# Patient Record
Sex: MALE | Race: WHITE | Employment: FULL TIME | URBAN - METROPOLITAN AREA
[De-identification: names, ages, dates, MRNs, and addresses within clinical notes are randomized per-mention and may not be internally consistent; named-entity substitution may affect disease eponyms.]

---

## 2024-07-12 ENCOUNTER — HOSPITAL ENCOUNTER (OUTPATIENT)
Age: 39
Discharge: HOME | End: 2024-07-12
Payer: COMMERCIAL

## 2024-07-12 ENCOUNTER — APPOINTMENT (OUTPATIENT)
Dept: GENERAL RADIOLOGY | Age: 39
End: 2024-07-12
Attending: PHYSICIAN ASSISTANT
Payer: COMMERCIAL

## 2024-07-12 VITALS
RESPIRATION RATE: 18 BRPM | HEART RATE: 88 BPM | TEMPERATURE: 98 F | OXYGEN SATURATION: 100 % | DIASTOLIC BLOOD PRESSURE: 89 MMHG | SYSTOLIC BLOOD PRESSURE: 133 MMHG

## 2024-07-12 DIAGNOSIS — J06.9 VIRAL URI WITH COUGH: Primary | ICD-10-CM

## 2024-07-12 PROCEDURE — 71046 X-RAY EXAM CHEST 2 VIEWS: CPT | Performed by: PHYSICIAN ASSISTANT

## 2024-07-12 PROCEDURE — 99203 OFFICE O/P NEW LOW 30 MIN: CPT | Performed by: PHYSICIAN ASSISTANT

## 2024-07-12 RX ORDER — BENZONATATE 100 MG/1
100 CAPSULE ORAL 3 TIMES DAILY PRN
Qty: 21 CAPSULE | Refills: 0 | Status: SHIPPED | OUTPATIENT
Start: 2024-07-12 | End: 2024-07-19

## 2024-07-12 NOTE — ED INITIAL ASSESSMENT (HPI)
Patient comes in states that he has had a productive cough continuing to get worse  for a month now and has had been using cold and sinus advil, and yesterday his mother who also had the same productive cough collapsed and went to ER and was dx with pneumonia and would also like a chest xray to rule out the same

## 2024-07-12 NOTE — ED PROVIDER NOTES
Patient Seen in: [facility]      History     Chief Complaint   Patient presents with    Cough     Stated Complaint: Cough    Subjective:   HPI    39 yo male here with c/o lingering cough for the last several weeks. States he had URI symptoms for a few weeks but cough now more wet sounding. Denies fever/chills, wheezing, cp or SOB. No change in exercise tolerance. Pt concerned as his mother as recently dx with pna.  Has been taking advil cold and sinus.    Objective:   History reviewed. No pertinent past medical history.           History reviewed. No pertinent surgical history.             Social History     Socioeconomic History    Marital status:    Tobacco Use    Smoking status: Never     Passive exposure: Never    Smokeless tobacco: Never   Vaping Use    Vaping status: Never Used   Substance and Sexual Activity    Alcohol use: Never    Drug use: Yes     Types: Cannabis     Comment: vape     Social Determinants of Health      Received from [facility]    Social Connections    Received from [facility]    Housing Stability              Review of Systems    Positive for stated Chief Complaint: Cough    Other systems are as noted in HPI.  Constitutional and vital signs reviewed.      All other systems reviewed and negative except as noted above.    Physical Exam     ED Triage Vitals [07/12/24 1523]   /89   Pulse 88   Resp 18   Temp 97.5 °F (36.4 °C)   Temp src Temporal   SpO2 100 %   O2 Device None (Room air)       Current Vitals:   Vital Signs  BP: 133/89  Pulse: 88  Resp: 18  Temp: 97.5 °F (36.4 °C)  Temp src: Temporal    Oxygen Therapy  SpO2: 100 %  O2 Device: None (Room air)            Physical Exam  Vitals and nursing note reviewed.   Constitutional:       General: He is not in acute distress.  HENT:      Head: Normocephalic and atraumatic.      Right Ear: External ear normal.      Left Ear: External ear normal.      Nose: Nose normal.       Mouth/Throat:      Mouth: Mucous membranes are moist.   Eyes:      Extraocular Movements: Extraocular movements intact.      Pupils: Pupils are equal, round, and reactive to light.   Cardiovascular:      Rate and Rhythm: Normal rate.   Pulmonary:      Effort: Pulmonary effort is normal.      Breath sounds: No wheezing or rhonchi.   Abdominal:      General: Abdomen is flat.   Musculoskeletal:         General: Normal range of motion.      Cervical back: Normal range of motion.   Skin:     General: Skin is warm.   Neurological:      General: No focal deficit present.      Mental Status: He is alert and oriented to person, place, and time.   Psychiatric:         Mood and Affect: Mood normal.         Behavior: Behavior normal.               ED Course   Labs Reviewed - No data to display      39 yo male here with c/o cough and congestion. Pt afebrile.  Lungs clear with no wheezing or rhonchi.  No hypoxia, no tachycardia    Ddx-viral URI, allergic rhinitis, pneumonia  Cxr negative for infiltrate or abnormality    Trial of tessalon. Pcp follow up rec'd              MDM                                         Medical Decision Making      Disposition and Plan     Clinical Impression:  1. Viral URI with cough         Disposition:  Discharge  7/12/2024  4:02 pm    Follow-up:  Deisy Pinto MD  [address] [facility]  2ND FLOOR  [facility] [address]  [phone]                Medications Prescribed:  Discharge Medication List as of 7/12/2024  4:04 PM        START taking these medications    Details   benzonatate 100 MG Oral Cap Take 1 capsule (100 mg total) by mouth 3 (three) times daily as needed for cough., Normal, Disp-21 capsule, R-0

## 2025-03-01 ENCOUNTER — HOSPITAL ENCOUNTER (OUTPATIENT)
Age: 40
Discharge: HOME | End: 2025-03-01
Payer: COMMERCIAL

## 2025-03-01 VITALS
DIASTOLIC BLOOD PRESSURE: 70 MMHG | SYSTOLIC BLOOD PRESSURE: 105 MMHG | RESPIRATION RATE: 16 BRPM | TEMPERATURE: 98 F | OXYGEN SATURATION: 99 % | HEART RATE: 63 BPM

## 2025-03-01 DIAGNOSIS — R30.0 DYSURIA: Primary | ICD-10-CM

## 2025-03-01 LAB
BILIRUB UR QL STRIP: NEGATIVE
CLARITY UR: CLEAR
COLOR UR: YELLOW
GLUCOSE UR STRIP-MCNC: NEGATIVE MG/DL
HGB UR QL STRIP: NEGATIVE
KETONES UR STRIP-MCNC: NEGATIVE MG/DL
LEUKOCYTE ESTERASE UR QL STRIP: NEGATIVE
NITRITE UR QL STRIP: NEGATIVE
PH UR STRIP: 7 [PH]
PROT UR STRIP-MCNC: NEGATIVE MG/DL
SP GR UR STRIP: 1.02
UROBILINOGEN UR STRIP-ACNC: <2 MG/DL

## 2025-03-01 PROCEDURE — 81002 URINALYSIS NONAUTO W/O SCOPE: CPT | Performed by: NURSE PRACTITIONER

## 2025-03-01 PROCEDURE — 99213 OFFICE O/P EST LOW 20 MIN: CPT | Performed by: NURSE PRACTITIONER

## 2025-03-01 PROCEDURE — 87491 CHLMYD TRACH DNA AMP PROBE: CPT | Performed by: NURSE PRACTITIONER

## 2025-03-01 PROCEDURE — 87591 N.GONORRHOEAE DNA AMP PROB: CPT | Performed by: NURSE PRACTITIONER

## 2025-03-01 RX ORDER — SULFAMETHOXAZOLE AND TRIMETHOPRIM 800; 160 MG/1; MG/1
1 TABLET ORAL 2 TIMES DAILY
Qty: 20 TABLET | Refills: 0 | Status: SHIPPED | OUTPATIENT
Start: 2025-03-01 | End: 2025-03-11

## 2025-03-01 NOTE — ED INITIAL ASSESSMENT (HPI)
Burning with urination, that starts off in the tip of the penis and then into the body. X 1week ago.

## 2025-03-01 NOTE — ED PROVIDER NOTES
Patient Seen in: [facility]      History     Chief Complaint   Patient presents with    Urinary Symptoms     Suspect UTI. Burning sensation in penis. - Entered by patient     Stated Complaint: Urinary Symptoms - Suspect UTI. Burning sensation in penis.    Subjective:   HPI    39-year-old male presents with concern for burning when he urinates.  He has had the symptoms for 1 week.  He denies penile discharge.  He denies penile pain swelling testicle pain or swelling.  There is no abdominal pain.  There was no fever.  Patient denies concern for STDs.  Patient is circumcised.      Objective:     History reviewed. No pertinent past medical history.           Past Surgical History:   Procedure Laterality Date    Vasectomy  01/06/2025                Social History     Socioeconomic History    Marital status:    Tobacco Use    Smoking status: Never     Passive exposure: Never    Smokeless tobacco: Never   Vaping Use    Vaping status: Never Used   Substance and Sexual Activity    Alcohol use: Never    Drug use: Yes     Types: Cannabis     Comment: vape     Social Drivers of Health      Received from [facility]    Housing Stability              Review of Systems    Positive for stated complaint: Urinary Symptoms - Suspect UTI. Burning sensation in penis.  Other systems are as noted in HPI.  Constitutional and vital signs reviewed.      All other systems reviewed and negative except as noted above.    Physical Exam     ED Triage Vitals [03/01/25 0849]   /70   Pulse 63   Resp 16   Temp 98 °F (36.7 °C)   Temp src Oral   SpO2 99 %   O2 Device None (Room air)       Current Vitals:   Vital Signs  BP: 105/70  Pulse: 63  Resp: 16  Temp: 98 °F (36.7 °C)  Temp src: Oral    Oxygen Therapy  SpO2: 99 %  O2 Device: None (Room air)        Physical Exam  Vitals reviewed.   Constitutional:       General: He is not in acute distress.     Appearance: He is not ill-appearing.   HENT:       Mouth/Throat:      Mouth: Mucous membranes are moist.   Cardiovascular:      Rate and Rhythm: Normal rate and regular rhythm.   Pulmonary:      Effort: Pulmonary effort is normal.      Breath sounds: Normal breath sounds.   Abdominal:      Palpations: Abdomen is soft.      Tenderness: There is no abdominal tenderness.   Musculoskeletal:         General: Normal range of motion.   Skin:     General: Skin is warm and dry.   Neurological:      General: No focal deficit present.      Mental Status: He is alert and oriented to person, place, and time.   Psychiatric:         Mood and Affect: Mood normal.         Behavior: Behavior normal.             ED Course     Labs Reviewed   [facility] POCT URINALYSIS DIPSTICK   CHLAMYDIA/GONOCOCCUS, ANAM                   MDM              Medical Decision Making  39-year-old male presents with dysuria.  Differential diagnosis includes UTI, kidney stone, STD, [place].  Patient reports 1 week of dysuria.  He denies hematuria.  There is no penile discharge.  He denies concern for STDs.  Urine specimen was collected and was negative for UTI.  No hematuria.  Urine specimen was sent for gonorrhea and chlamydia.  Today's urine results were discussed with patient.  The cause of his dysuria is unclear but could be a prostatitis.  Will treat patient with Bactrim.  He was instructed to follow-up with his primary or urology if his symptoms worsen or do not improve.    Amount and/or Complexity of Data Reviewed  Labs: ordered.     Details: POC urine shows neg UTI, neg blood    Risk  OTC drugs.  Prescription drug management.        Disposition and Plan     Clinical Impression:  1. Dysuria         Disposition:  Discharge  3/1/2025  9:29 am    Follow-up:  Deisy Pinto MD  [address] [facility]  2ND FLOOR  [facility] [address]  [phone]      If symptoms worsen          Medications Prescribed:  Discharge Medication List as of 3/1/2025  9:29 AM        START taking these medications    Details    sulfamethoxazole-trimethoprim -160 MG Oral Tab per tablet Take 1 tablet by mouth 2 (two) times daily for 10 days., Normal, Disp-20 tablet, R-0                 Supplementary Documentation:

## 2025-03-03 LAB
C TRACH DNA SPEC QL NAA+PROBE: NEGATIVE
N GONORRHOEA DNA SPEC QL NAA+PROBE: NEGATIVE

## 2025-03-10 ENCOUNTER — HOSPITAL ENCOUNTER (OUTPATIENT)
Age: 40
Discharge: HOME | End: 2025-03-10
Payer: COMMERCIAL

## 2025-03-10 VITALS
HEART RATE: 99 BPM | OXYGEN SATURATION: 99 % | SYSTOLIC BLOOD PRESSURE: 126 MMHG | RESPIRATION RATE: 18 BRPM | TEMPERATURE: 99 F | DIASTOLIC BLOOD PRESSURE: 74 MMHG

## 2025-03-10 DIAGNOSIS — L27.0 DRUG-INDUCED SKIN RASH: Primary | ICD-10-CM

## 2025-03-10 PROCEDURE — 99213 OFFICE O/P EST LOW 20 MIN: CPT | Performed by: PHYSICIAN ASSISTANT

## 2025-03-10 RX ORDER — PREDNISONE 20 MG/1
40 TABLET ORAL ONCE
Status: COMPLETED | OUTPATIENT
Start: 2025-03-10 | End: 2025-03-10

## 2025-03-10 RX ORDER — PREDNISONE 20 MG/1
40 TABLET ORAL DAILY
Qty: 8 TABLET | Refills: 0 | Status: SHIPPED | OUTPATIENT
Start: 2025-03-10 | End: 2025-03-14

## 2025-03-10 NOTE — ED PROVIDER NOTES
Patient Seen in: [facility]      History     Chief Complaint   Patient presents with    Allergies     Broke out in significant hives overnight, possibly due to antibiotic prescribed March 1 - Entered by patient    Allergic Rxn Allergies     Stated Complaint: Allergies - Broke out in significant hives overnight, possibly due to antibioti*    Subjective:   HPI      Patient is a healthy 39-year-old male who presents to immediate care due to pruritic rash x 2 days.  Patient states he has had gradual onset pruritic rash starting at bilateral lower legs now diffuse on torso and bilateral extremities.  Patient has taken loratadine with mild relief.  Patient states he was recently taking Bactrim for UTI that has now since resolved.  Denies wheezing shortness of breath, oral sores, vomiting.      Objective:     History reviewed. No pertinent past medical history.           Past Surgical History:   Procedure Laterality Date    Vasectomy  01/06/2025                Social History     Socioeconomic History    Marital status:    Tobacco Use    Smoking status: Never     Passive exposure: Never    Smokeless tobacco: Never   Vaping Use    Vaping status: Never Used   Substance and Sexual Activity    Alcohol use: Never    Drug use: Yes     Types: Cannabis     Comment: vape     Social Drivers of Health      Received from [facility]    Housing Stability              Review of Systems    Positive for stated complaint: Allergies - Broke out in significant hives overnight, possibly due to antibioti*  Other systems are as noted in HPI.  Constitutional and vital signs reviewed.      All other systems reviewed and negative except as noted above.    Physical Exam     ED Triage Vitals [03/10/25 1320]   /74   Pulse 99   Resp 18   Temp 99.1 °F (37.3 °C)   Temp src Oral   SpO2 99 %   O2 Device None (Room air)       Current Vitals:   Vital Signs  BP: 126/74  Pulse: 99  Resp: 18  Temp: 99.1 °F (37.3  °C)  Temp src: Oral    Oxygen Therapy  SpO2: 99 %  O2 Device: None (Room air)        Physical Exam  Vital signs reviewed. Nursing note reviewed.  Constitutional: Well-developed. Well-nourished. In no acute distress  HENT: Mucous membranes moist.  No posterior pharynx edema erythema.  No oral sores or lesions.  No trismus  EYES: No scleral icterus or conjunctival injection.  NECK: Full ROM. Supple.   CARDIAC: Normal rate. Normal S1/ S2. 2+ distal pulses. No edema  PULM/CHEST: Clear to auscultation bilaterally. No wheezes  Extremities: Full ROM  NEURO: Awake, alert, following commands, moving extremities, answering questions.   SKIN: Warm and dry.  Morbilliform diffuse rash on torso and extremities.  PSYCH: Normal judgment. Normal affect.           MDM      Patient is a 39-year-old male that presents to immediate care due to pruritic rash x 2 days.  Patient arrives with stable vitals sitting comfortably speaking complete sentences in no respiratory distress.  Physical exam showing diffuse morbilliform rash on torso and extremities sparing face.  Most likely drug eruption rash, allergic reaction.  Less likely angioedema, anaphylaxis, SJS, TEN.  Will treat with prednisone prior to discharge.  Encouraged use of antihistamines such as Claritin Zyrtec and Benadryl.  Will additionally prescribe 4-day course of prednisone.  Discussed with patient to Discontinue antibiotics and added Bactrim to patient's drug allergy list.  History given by patient.  ED precautions discussed including worsening rash, development of shortness of breath, oral lesions or skin sloughing.  Patient agreeable to plan all questions answered.        Medical Decision Making      Disposition and Plan     Clinical Impression:  1. Drug-induced skin rash         Disposition:  Discharge  3/10/2025  1:55 pm    Follow-up:  Deisy Pinto MD  [address] [facility]  2ND FLOOR  [facility] [address]  [phone]    Call       [facility]  [address]  [facility] [id number] [phone]  Go to   if symptoms get worse          Medications Prescribed:  Current Discharge Medication List        START taking these medications    Details   predniSONE 20 MG Oral Tab Take 2 tablets (40 mg total) by mouth daily for 4 days.  Qty: 8 tablet, Refills: 0                 Supplementary Documentation:

## 2025-03-10 NOTE — ED INITIAL ASSESSMENT (HPI)
Was on bactrim ABX for uti one week ago. Broke out in hives on day 9/10 last night.   Took loratadine for the hives.   Denies sob, a bit of a voice change

## 2025-03-10 NOTE — DISCHARGE INSTRUCTIONS
Today you were seen for a rash that is most likely an allergic reaction due to Bactrim, the antibiotic that you most recently took.  Please discontinue these antibiotics.  Please take antihistamines to additionally help with symptoms.  You may take loratadine dose every day in the morning.  You may additionally take 50 mg of Benadryl at nighttime over the next few days.  Please take prednisone as prescribed.  You were given 1 dose prior to discharge.  Please start additional dose of prednisone tomorrow morning.  Seek prompt medical reevaluation if you begin to have worsening rash, development of skin sloughing, shortness of breath wheezing or mouth sores.

## 2025-03-21 ENCOUNTER — HOSPITAL ENCOUNTER (OUTPATIENT)
Age: 40
Discharge: HOME | End: 2025-03-21
Payer: COMMERCIAL

## 2025-03-21 VITALS
TEMPERATURE: 99 F | HEART RATE: 71 BPM | SYSTOLIC BLOOD PRESSURE: 120 MMHG | DIASTOLIC BLOOD PRESSURE: 81 MMHG | RESPIRATION RATE: 16 BRPM | OXYGEN SATURATION: 100 %

## 2025-03-21 DIAGNOSIS — J02.9 SORE THROAT: Primary | ICD-10-CM

## 2025-03-21 LAB — S PYO AG THROAT QL: NEGATIVE

## 2025-03-21 PROCEDURE — 87880 STREP A ASSAY W/OPTIC: CPT | Performed by: PHYSICIAN ASSISTANT

## 2025-03-21 PROCEDURE — 99212 OFFICE O/P EST SF 10 MIN: CPT | Performed by: PHYSICIAN ASSISTANT

## 2025-03-21 NOTE — ED PROVIDER NOTES
Patient Seen in: [facility]      History     Chief Complaint   Patient presents with    Sore Throat     Stated Complaint: Throat issue    Subjective:   HPI    39-year-old male presenting for evaluation of sore throat for the last 2 to 3 days.  No associated fever/chills, change in voice.  He has no complaint of neck pain.  Children at home have been sick.    Objective:     No pertinent past medical history.            No pertinent past surgical history.              No pertinent social history.            Review of Systems    Positive for stated complaint: Throat issue  Other systems are as noted in HPI.  Constitutional and vital signs reviewed.      All other systems reviewed and negative except as noted above.    Physical Exam     ED Triage Vitals [03/21/25 1214]   /81   Pulse 71   Resp 16   Temp 98.8 °F (37.1 °C)   Temp src Oral   SpO2 100 %   O2 Device None (Room air)       Current Vitals:   Vital Signs  BP: 120/81  Pulse: 71  Resp: 16  Temp: 98.8 °F (37.1 °C)  Temp src: Oral    Oxygen Therapy  SpO2: 100 %  O2 Device: None (Room air)        Physical Exam  Vitals and nursing note reviewed.   Constitutional:       General: He is not in acute distress.  HENT:      Head: Normocephalic and atraumatic.      Right Ear: External ear normal.      Left Ear: External ear normal.      Nose: Nose normal.      Mouth/Throat:      Mouth: Mucous membranes are moist.      Tonsils: No tonsillar exudate.   Eyes:      Extraocular Movements: Extraocular movements intact.      Pupils: Pupils are equal, round, and reactive to light.   Cardiovascular:      Rate and Rhythm: Normal rate.   Pulmonary:      Effort: Pulmonary effort is normal.   Abdominal:      General: Abdomen is flat.   Musculoskeletal:         General: Normal range of motion.      Cervical back: Normal range of motion.   Skin:     General: Skin is warm.   Neurological:      General: No focal deficit present.      Mental Status: He is alert and  oriented to person, place, and time.   Psychiatric:         Mood and Affect: Mood normal.         Behavior: Behavior normal.             ED Course     Labs Reviewed   POCT RAPID STREP - Normal        39-year-old male presenting from home for evaluation of sore throat for the last 2 to 3 days.  He is afebrile.  Posterior oropharynx with minimal erythema, no edema and no exudates.  Uvula midline.    Ddx-viral pharyngitis, postnasal drip, strep pharyngitis, influenza    Strep test negative.  Suspect allergic rhinitis versus postnasal drip.  Continue supportive care, OTC meds.           MDM              Medical Decision Making      Disposition and Plan     Clinical Impression:  1. Sore throat         Disposition:  Discharge  3/21/2025 12:28 pm    Follow-up:  Deisy iPnto MD  [address] [place] [address]  2ND FLOOR  [facility] [id number] [phone]                Medications Prescribed:  Current Discharge Medication List              Supplementary Documentation:

## 2025-06-23 ENCOUNTER — HOSPITAL ENCOUNTER (OUTPATIENT)
Age: 40
Discharge: HOME | End: 2025-06-23
Payer: COMMERCIAL

## 2025-06-23 VITALS
HEART RATE: 69 BPM | SYSTOLIC BLOOD PRESSURE: 129 MMHG | DIASTOLIC BLOOD PRESSURE: 83 MMHG | OXYGEN SATURATION: 98 % | RESPIRATION RATE: 16 BRPM | TEMPERATURE: 98 F

## 2025-06-23 DIAGNOSIS — L03.032 PARONYCHIA OF GREAT TOE OF LEFT FOOT: Primary | ICD-10-CM

## 2025-06-23 PROCEDURE — 99213 OFFICE O/P EST LOW 20 MIN: CPT | Performed by: NURSE PRACTITIONER

## 2025-06-23 RX ORDER — CEPHALEXIN 500 MG/1
500 CAPSULE ORAL 4 TIMES DAILY
Qty: 28 CAPSULE | Refills: 0 | Status: SHIPPED | OUTPATIENT
Start: 2025-06-23 | End: 2025-06-30

## 2025-06-23 NOTE — DISCHARGE INSTRUCTIONS
Start warm soaks of left foot, 3-4 times a day for 5 minutes to help with inflammation around the nailbed of your great toe    If you noticed this area open, do some gentle pressing, rinse with water, pat dry and place mupirocin ointment 3 times a day until healed.    If this area does not open and drain, more redness, swelling and pain occur around nailbed, fevers develop, start oral antibiotics sent to your pharmacy on file.    If you are doing these things and the swelling, redness, discomfort continues, there is change in skin color to yellow with pus underlying the skin, please come back for incision and drainage of paronychia around nailbed

## 2025-06-23 NOTE — ED PROVIDER NOTES
Patient Seen in: [facility]        History  Chief Complaint   Patient presents with    Leg or Foot Injury     Toe may have minor infection - Entered by patient     Stated Complaint: Leg or Foot Injury - Toe may have minor infection    Subjective:   HPI    39 yr old male here for evaluation of redness and swelling around bottom of nailbed of left great toe that he noticed this week. He denies significant pain unless walking a lot. Denies fever or chills, pus drainage, redness streaking up foot, loss of mobility due to concern.     Objective:     History reviewed. No pertinent past medical history.           Past Surgical History:   Procedure Laterality Date    Vasectomy  01/06/2025                Social History     Socioeconomic History    Marital status:    Tobacco Use    Smoking status: Never     Passive exposure: Never    Smokeless tobacco: Never   Vaping Use    Vaping status: Never Used   Substance and Sexual Activity    Alcohol use: Never    Drug use: Yes     Types: Cannabis     Comment: vape     Social Drivers of Health      Received from [facility]    Housing Stability              Review of Systems    Positive for stated complaint: Leg or Foot Injury - Toe may have minor infection  Other systems are as noted in HPI.  Constitutional and vital signs reviewed.      All other systems reviewed and negative except as noted above.                  Physical Exam    ED Triage Vitals [06/23/25 1121]   /83   Pulse 69   Resp 16   Temp 97.8 °F (36.6 °C)   Temp src Oral   SpO2 98 %   O2 Device None (Room air)       Current Vitals:   Vital Signs  BP: 129/83  Pulse: 69  Resp: 16  Temp: 97.8 °F (36.6 °C)  Temp src: Oral    Oxygen Therapy  SpO2: 98 %  O2 Device: None (Room air)            Physical Exam  Vitals and nursing note reviewed.   Constitutional:       General: He is not in acute distress.     Appearance: Normal appearance. He is not ill-appearing, toxic-appearing or  diaphoretic.   Cardiovascular:      Rate and Rhythm: Normal rate.   Pulmonary:      Effort: Pulmonary effort is normal. No respiratory distress.   Musculoskeletal:      Left foot: Normal range of motion. No deformity.        Feet:    Feet:      Left foot:      Skin integrity: Erythema present.      Toenail Condition: Left toenails are normal.      Comments: +mild redness, swelling to medial side of left great toenail bed at cuticle  Skin:     General: Skin is warm and dry.      Findings: No rash.   Neurological:      Mental Status: He is alert and oriented to person, place, and time.      Motor: No weakness.   Psychiatric:         Mood and Affect: Mood normal.         Behavior: Behavior normal.               ED Course  Labs Reviewed - No data to display                       MDM    39 yr old male here for evaluation of redness, swelling around nailbed of great toe started this past week.    ON exam, pt well appearing. Noted mild redness around medial side of cuticle left great toe. NO fluctuance palpated. No obvious pus noted, no yellowing of the skin. No significant pain on palpation. Full ROM to toes, able to walk with no distress. No open wounds, erythema or warmth.    Differential diagnoses reflecting the complexity of care include but are not limited to paryonychia, cellulitis, abscess.    Comorbidities that add complexity to management include: none  History obtained by an independent source was from: patient  My independent interpretations of studies include: none  Shared decision making was done by: patient, myself  Discussions of management was done with: patient    Patient is well appearing, non-toxic and in no acute distress.  Vital signs are stable.   PT area of concern appears like the start of a paronychia. I do not believe it is necessary to open today as it is not discolored, or significantly fluctuant at this time.    Advised on warm soaks, and watching progression. IF this area opens start placing  mupirocin ointment to help with infection (pt has some at home). Discussed if it does not open, but starts becoming more red, and swollen, to start oral abx sent to pharmacy on file for him. Discussed if worsening redness, discoloration like pus under skin and painful, return for Incision and drainage.    PT agrees with plan.  All questions answered. Return and ER precautions given.    Counseled: Patient, regarding diagnosis, regarding treatment plan, regarding diagnostic results, regarding prescription, I have discussed with the patient the results of tests, differential diagnosis, and warning signs and symptoms that should prompt immediate return. The patient understands these instructions and agrees to the follow-up plan provided. There is no barriers to learning. Appropriate f/u given. Patient agrees to return for any concerns/ problems/complications.            Medical Decision Making      Disposition and Plan     Clinical Impression:  1. Paronychia of great toe of left foot         Disposition:  Discharge  6/23/2025 11:43 am    Follow-up:  [address] [address]  [phone]    If symptoms worsen          Medications Prescribed:  Discharge Medication List as of 6/23/2025 11:48 AM        START taking these medications    Details   cephALEXin 500 MG Oral Cap Take 1 capsule (500 mg total) by mouth 4 (four) times daily for 7 days., Normal, Disp-28 capsule, R-0                   Supplementary Documentation: